# Patient Record
Sex: MALE | Race: WHITE | NOT HISPANIC OR LATINO
[De-identification: names, ages, dates, MRNs, and addresses within clinical notes are randomized per-mention and may not be internally consistent; named-entity substitution may affect disease eponyms.]

---

## 2022-03-07 ENCOUNTER — APPOINTMENT (OUTPATIENT)
Dept: UROLOGY | Facility: CLINIC | Age: 52
End: 2022-03-07
Payer: MEDICAID

## 2022-03-07 VITALS
BODY MASS INDEX: 22.76 KG/M2 | DIASTOLIC BLOOD PRESSURE: 87 MMHG | SYSTOLIC BLOOD PRESSURE: 132 MMHG | HEART RATE: 81 BPM | HEIGHT: 70 IN | WEIGHT: 159 LBS

## 2022-03-07 PROBLEM — Z00.00 ENCOUNTER FOR PREVENTIVE HEALTH EXAMINATION: Status: ACTIVE | Noted: 2022-03-07

## 2022-03-07 PROCEDURE — 99204 OFFICE O/P NEW MOD 45 MIN: CPT

## 2022-03-07 NOTE — ASSESSMENT
[FreeTextEntry1] : \par =======================================================================================\par ASSESSMENT and PLAN\par \par The patient is a 51 year male with a history of the following:\par \par 1. I do not feel a plaque and so I can not tell if he truly has Peyronie's. It bothers him a bit at this time.\par I offered him a consultation with one of our ED partners who specializes in this or he can wait and see how it develops. He could also bring a picture back in as well.\par \par We had a long conversation about this. He knows it is not dangerous and has opted to just observe this for now.\par \par 2. Very slight testicular pain: He had a repeat sono with Dr. Loco that he states was normal. I reassured him.\par \par 3. Diet: We discussed dietary prevention of gout and we talked about fish consumption, safe amounts given contamination and cooking techniques. He understood\par \par -----------------------------------------------------------------------------------------------------\par LABS/TESTS Ordered:\par Meds Ordered:\par Follow up: PRN\par -----------------------------------------------------------------------------------------------------\par \par Greater than 50% of 60 minute visit was spent counseling the patient and coordinating care.\par \par Thank you for allowing me to assist in the care of your patient. Should you have any questions please do not hesitate to reach out to me.\par \par \par Ashley Sparrow MD\par Associate \par Department of Urology\par [facility]\par Phone: [phone]\par Fax: [phone]\par \par [address]\[facility] [address]\[facility]

## 2022-03-07 NOTE — HISTORY OF PRESENT ILLNESS
[FreeTextEntry1] : Language: English\par Date of First visit: 3/7/2022\par Accompanied by: Self\par Contact info: [phone]\par Referring Provider/PCP: Claudy\par \par \par \par CC/ Problem List:\par \par ===============================================================================\par FIRST VISIT:\par The patient is a 51 year male who first presents 03/07/2022 for Peyronie's disease.\par \par He stated he first noticed it around Nov 2021. He thinks it getting worse. He can not recall trauma. He had some swelling when he would masturbate when he was a child. He had some pain in the tip of his penis around 1787-6729. In Jan 2020 he was having pain in his right testicle. He was told he had epididymitis. It is 99.9% gone. He does have an umbilical hernia. \par \par He does not recall any sexual trauma or masturbatory trauma. He thinks the curvature is on the bottom right and it curves to the right. He states his penis kind of "cork screws" right at the base. He is not sexually active so he can not tell if it prevents him from having intercourse. He has no problems urinating.\par \par \par -------------------------------------------------------------------------------------------\par INTERVAL VISITS:\par \par \par ===============================================================================\par \par PMH: Denies\par PSH: Inguinal hernia \par POBH: (if applicable)\par FH: Grandmother had breast cancer\par Father and brother have gout issues\par \par ALL: NKDA\par MEDS: Flonase, Vit D, Fishoil\par SOC: Social wine, No tobacco\par \par \par ROS: Review of Systems is as per HPI unless otherwise denoted below\par \par \par ===============================================================================\par DATA: \par \par LABS:-------------------------------------------------------------------------------------------------------------------\par \par \par \par RADS:-------------------------------------------------------------------------------------------------------------------\par \par \par \par PATHOLOGY/CYTOLOGY:-------------------------------------------------------------------------------------------\par \par \par \par VOIDING STUDIES: ----------------------------------------------------------------------------------------------------\par \par \par \par STONE STUDIES: (Analysis/LLSA)----------------------------------------------------------------------------------\par \par \par \par PROCEDURES: -----------------------------------------------------------------------------------------------\par \par \par \par \par ===============================================================================\par \par PHYSICAL EXAM:\par \par GEN: AAOx3, NAD; Habitus: normal\par \par BARRIERS to CARE: none\par \par PSYCH: Appropriate Behavior, Affect Congruent\par \par HEENT: AT/NC Trachea midline. EOMI.\par \par Lungs: No labored breathing.\par \par NEURO: + Movement, all 4 extremities grossly intact without deficits. No tremors.\par \par SKIN: Warm dry. No visible rashes or ulcers\par \par GAIT: Gait normal, Stability good\par \par \par  FOCUSED: -------------------------------------------------------------------------------------------------\par \par Penis: Normal circ phallus. No lesions, tenderness. There is no curvature in the flaccid state and I do not feel a plaque (he states it is at the right base)\par \par Meatus: No Stenosis, Orthotopic.\par \par =======================================================================================\par \par

## 2023-05-04 ENCOUNTER — APPOINTMENT (OUTPATIENT)
Dept: UROLOGY | Facility: CLINIC | Age: 53
End: 2023-05-04
Payer: MEDICAID

## 2023-05-04 VITALS
TEMPERATURE: 98.2 F | SYSTOLIC BLOOD PRESSURE: 118 MMHG | WEIGHT: 164.99 LBS | HEIGHT: 70 IN | HEART RATE: 95 BPM | BODY MASS INDEX: 23.62 KG/M2 | DIASTOLIC BLOOD PRESSURE: 76 MMHG | OXYGEN SATURATION: 97 %

## 2023-05-04 PROCEDURE — 99213 OFFICE O/P EST LOW 20 MIN: CPT

## 2023-05-07 NOTE — HISTORY OF PRESENT ILLNESS
[FreeTextEntry1] : Language: English\par Date of First visit: 3/7/2022\par Accompanied by: Self\par Contact info: [phone]\par Referring Provider/PCP: Claudy\par \par \par \par CC/ Problem List:\par \par ===============================================================================\par FIRST VISIT:\par The patient is a 51 year male who first presents 03/07/2022 for Peyronie's disease.\par \par He stated he first noticed it around Nov 2021. He thinks it getting worse. He can not recall trauma. He had some swelling when he would masturbate when he was a child. He had some pain in the tip of his penis around 2404-1274. In Jan 2020 he was having pain in his right testicle. He was told he had epididymitis. It is 99.9% gone. He does have an umbilical hernia. \par \par He does not recall any sexual trauma or masturbatory trauma. He thinks the curvature is on the bottom right and it curves to the right. He states his penis kind of "cork screws" right at the base. He is not sexually active so he can not tell if it prevents him from having intercourse. He has no problems urinating.\par -------------------------------------------------------------------------------------------\par Interval History 05/04/2023 (initial visit with Jared):\nicki Presents today for f/u.  Last seen in clinic 03/07/2022.\par \par At his last visit, he elected not to undergo any additional evaluation or mgmt of the peyronie's. Today, he states that he is extremely nervous about fracture while sex.\par \par States he has a good understanding of physics/spatial awareness, and he is worried that any weight on a curved penis will cause fracture.\par \par Currently not in a relationship/sexually active, but he is hesitant to pursue women because of his curve. \par \par Thinks it curves about 40 degrees to the right. Denies pain with erections.\par ===============================================================================\par \par PMH: Denies\par PSH: Inguinal hernia \par POBH: (if applicable)\par FH: Grandmother had breast cancer\par Father and brother have gout issues\par \par ALL: NKDA\par MEDS: Flonase, Vit D, Fishoil\par SOC: Social wine, No tobacco\par \par \par ROS: Review of Systems is as per HPI unless otherwise denoted below\par \par ===============================================================================\par \par PHYSICAL EXAM:\par \par GEN: AAOx3, NAD; Habitus: normal\par \par BARRIERS to CARE: none\par \par PSYCH: Appropriate Behavior, Affect Congruent\par \par HEENT: AT/NC Trachea midline. EOMI.\par \par Lungs: No labored breathing.\par \par NEURO: + Movement, all 4 extremities grossly intact without deficits. No tremors.\par \par SKIN: Warm dry. No visible rashes or ulcers\par \par GAIT: Gait normal, Stability good\par \par \par  FOCUSED: -------------------------------------------------------------------------------------------------\par \par Penis: Normal circ phallus. No lesions, tenderness. There is no curvature in the flaccid state and I do not feel a plaque (he states it is at the right base)\par \par Meatus: No Stenosis, Orthotopic.\par \par ANNA Coleman (5/4/23): normal appearing circ phallus, no appreciable plaque, could not appreciate the plaque the patient states is there at the right base\par \par =======================================================================================\par \par A/P: 52M with abnormal, curbed erections, likely 2/2 ?peyronie's.\par \par We had a long discussion today about Mr. Santiago's condition.  I explained to him that his concerns about fracture are not illegitimate, although a fracture is unlikely to happen simply because of the curve.  I explained the mechanics of sexual activity, and his concerns about a curved penis fracturing are more likely to happens if he hits his erection against a fixed object, which can happen to straight penises as well.  Nonetheless, prior to any decision on next steps, we would need to perform a test erection in the office to determine severity of the curve and his overall anatomy.  Following test erection, we will discuss management options in more detail.

## 2023-05-18 ENCOUNTER — APPOINTMENT (OUTPATIENT)
Dept: UROLOGY | Facility: CLINIC | Age: 53
End: 2023-05-18
Payer: MEDICAID

## 2023-05-18 VITALS
HEART RATE: 87 BPM | HEIGHT: 70 IN | BODY MASS INDEX: 23.62 KG/M2 | DIASTOLIC BLOOD PRESSURE: 83 MMHG | OXYGEN SATURATION: 98 % | WEIGHT: 164.99 LBS | SYSTOLIC BLOOD PRESSURE: 122 MMHG

## 2023-05-18 DIAGNOSIS — N52.9 MALE ERECTILE DYSFUNCTION, UNSPECIFIED: ICD-10-CM

## 2023-05-18 PROCEDURE — 99214 OFFICE O/P EST MOD 30 MIN: CPT | Mod: 25

## 2023-05-18 PROCEDURE — 54235 NJX CORPORA CAVERNOSA RX AGT: CPT

## 2023-05-18 RX ORDER — TADALAFIL 5 MG/1
5 TABLET ORAL
Qty: 30 | Refills: 11 | Status: ACTIVE | COMMUNITY
Start: 2023-05-18 | End: 1900-01-01

## 2023-06-18 NOTE — HISTORY OF PRESENT ILLNESS
[FreeTextEntry1] : Language: English\par Date of First visit: 3/7/2022\par Accompanied by: Self\par Contact info: [phone]\par Referring Provider/PCP: Claudy\par \par CC/ Problem List:\par \par ===============================================================================\par FIRST VISIT:\par The patient is a 51 year male who first presents 03/07/2022 for Peyronie's disease.\par \par He stated he first noticed it around Nov 2021. He thinks it getting worse. He can not recall trauma. He had some swelling when he would masturbate when he was a child. He had some pain in the tip of his penis around 0589-9399. In Jan 2020 he was having pain in his right testicle. He was told he had epididymitis. It is 99.9% gone. He does have an umbilical hernia. \par \par He does not recall any sexual trauma or masturbatory trauma. He thinks the curvature is on the bottom right and it curves to the right. He states his penis kind of "cork screws" right at the base. He is not sexually active so he can not tell if it prevents him from having intercourse. He has no problems urinating.\par -------------------------------------------------------------------------------------------\par Interval History 05/18/23:\nicki Presents today for f/u.  Last seen in clinic 05/04/2023.\par \par At his last visit he stated that he is extremely nervous about fracture while sex.\par \par States he has a good understanding of physics/spatial awareness, and he is worried that any weight on a curved penis will cause fracture.\par \par Currently not in a relationship/sexually active, but he is hesitant to pursue women because of his curve. \par \par Thinks it curves about 40 degrees to the right. Denies pain with erections.\par \par Test erection performed today with 10U Trimix #8.  Pt achieved 9/10 erection. \par Findings: mild curve noted at the midshaft, approx 15-20 degrees.  \par ===============================================================================\par \par PMH: Denies\par PSH: Inguinal hernia \par POBH: (if applicable)\par FH: Grandmother had breast cancer\par Father and brother have gout issues\par \par ALL: NKDA\par MEDS: Flonase, Vit D, Fishoil\par SOC: Social wine, No tobacco\par \par \par ROS: Review of Systems is as per HPI unless otherwise denoted below\par \par ===============================================================================\par \par PHYSICAL EXAM:\par \par GEN: AAOx3, NAD; Habitus: normal\par \par BARRIERS to CARE: none\par \par PSYCH: Appropriate Behavior, Affect Congruent\par \par HEENT: AT/NC Trachea midline. EOMI.\par \par Lungs: No labored breathing.\par \par NEURO: + Movement, all 4 extremities grossly intact without deficits. No tremors.\par \par SKIN: Warm dry. No visible rashes or ulcers\par \par GAIT: Gait normal, Stability good\par \par \par  FOCUSED: -------------------------------------------------------------------------------------------------\par \par Penis: Normal circ phallus. No lesions, tenderness. There is no curvature in the flaccid state and I do not feel a plaque (he states it is at the right base)\par \par Meatus: No Stenosis, Orthotopic.\par \par  Luis Manuelrdloff (5/4/23): normal appearing circ phallus, no appreciable plaque, could not appreciate the plaque the patient states is there at the right base\par  5/18/23 (see above in HPI for test erection results).\par \par =======================================================================================\par \par A/P: 52M with abnormal, curved erections, likely 2/2 peyronie's, test erection performed today showing mild rightward curvature. \par \par I have discussed and reviewed all of the details of the patient’s history as well as physical examination. The patient understands the risks, benefits, and all alternatives to surgery. He understands that while he has been diagnosed with Peyronie’s disease (or abnormal penile curvature), his operation is not a surgical emergency and that it is a purely elective procedure designed to beneficially impact his quality of life. \par \par I have reviewed with him the chronic nature of Peyronie’s disease as well as its associated conditions. The patient now understands that there is an acute (painful) phase to this disease and surgeons normally undertake operative correction of angulation when the disease is in the non-painful (chronic) state. I have reviewed all alternatives to surgery and the patient understands that excision with plaque grafting is usually undertaken for only the most severe types of penile curvature. Also, plaque excision has a notable risk of causing erectile dysfunction (as high as 25% in some series) which would be highly undesirable in patients who are otherwise potent. For patients with concomitant erectile dysfunction non-amenable to oral medications, penile implant surgery (with or without plication) may be indicated. \par \par I've discussed with the patient that there are generally for treatment options for this condition:\par \par 1. Xiaflex/collagenase injections. I discussed the recent innovations in non-surgical therapy that have also been made for those patients with Peyronie’s disease, including the development of collagenase injections (such as Xiaflex). Although promising in theory, there are some serious logistical issues with medications such as Xiaflex including the need for multiple treatments in the office, the need for manual modeling at the time of injection, pain, and a real risk of corporal rupture (1-2%). I explained that the best data indicate that these injection of treatments can result in improvement of her right and by roughly 35% or roughly 20° absolute. I've discussed that 2 injections of the medication are performed within a one-week time period and then manual stretching exercises are performed for the next 4-6 weeks this constitutes one cycle. I've explained that up to 4 treatment cycles might be indicated. I've also explained that I perform a tight dressing over the penis and I have lastly also explained the risk of corporal rupture being low but real.\par \par 2. Penile plication surgery - Due to risks of causing de-aggie erectile dysfunction, I have explained to the patient my practice tends strongly towards penile plication surgery (even in cases with curvature exceeding 60 degrees). I have discussed with the patient the possibility of perceived penile length loss following plication despite large scale series demonstrating that the majority of patients undergoing correction have no measureable decrease in penile length following surgery. I specifically reviewed the series by Ricardo et al. (J. of Urology, 2013) that demonstrated 84% of patients experienced no measureable length loss, but 78% of patients perceived to have lost some measure of penile length. Despite these observations, I have discussed with the patient that penile plication surgery has a far less chance of causing erectile dysfunction following surgery and that penile sensation loss after plication also is uncommon. I have discussed that at the time of plication, 1-2 alprostadil injections are given to stimulate an erection and that operative correction is usually undertaken with an approximately 2 inch incision along the area opposite the greatest curvature. Degloving incisions are usually not performed for this surgery. In rare instances, the alprostadil injection itself may cause an episode of priapism that will then need to be treated appropriately. In terms of plication correction, non-absorbable sutures are placed in order to correct the penis and fewer than 10% of patients feel the sutures 3 months after surgery. For patients with persistent palpability of the suture in long-term follow-up, a steroid injection of the suture site may be necessary (and can usually be performed in the office). Most importantly with plication surgery, the patient now understands that the actual Peyronie’s plaque that is a manifestation of the fibrosis reaction in the corpora cavernosa will not be removed and the patient may continue to feel their respective plaque (as well as correction sutures) following surgery. The patient understands the goal of plication surgery is to beneficially impact quality of life and improve the sexual encounter so as to not cause pain for either the patient or partner at the time of sexual intercourse. The patient understands that he might perceived there to be penile shortening, but that in the majority of cases objective penile shortening is not demonstrated.\par \par 3. Plaque excision/incision with grafting - I have explained the plaque excision or incision with grafting. The patient understands that many patients in my practice that presents with Peyronie's do not choose this option and are not the best candidates do to either concerns over causing de aggie erectile dysfunction, concerns or sensation loss, or poor baseline erections which occurs in the majority of cases. Nevertheless, I have explained that I picked candidates for this procedure quite carefully and that if a patient is deemed to be a good candidate here, they understand a degloving incision was performed, mobilization of the neurovascular bundle was done, and the plaque is either excised or incised depending on intraoperative decision making. I also explained that at the time of excision or incision of the plaque, a graft-like material needs to be performed at the site of plaque mobilization in order to help alleviate the curvature issues. The standard graft that I used is a Tutoplast graft - but I've also had a very novel experience using a collagen fleece-like material called Tachosil for grafting which has had demonstrable success in [place]. The patient understands that generally following this surgery, patients are noted overnight and tedious wound care will need to be performed for roughly 2 weeks. I also discussed with them the need for penile traction therapy if at all feasible which I institute postoperatively based on intraoperative variables and patient motivation. This is predominantly done in order to help with length preservation. Lastly, the patient understands that should he experience sensitivity changes, these will generally improve in the coming weeks to months following surgery. He also understands that erection problems following surgery could be transient and and can be resolved with oral medications but could also be permanent and might require permanent medications, injection of therapy, or even implant surgery. \par \par 4. Traction therapy -I spent some time discussing penile traction therapy. I explained to him that a lot of historic evidence did exist regarding traction therapy but that much of that data really revolved around the concept of wearing a traction device for upwards of 8 to 12 hours/day for a long period of time that extended into months. I did explain however that there is been a fair amount of contemporary Level 1 based evidence regarding traction therapy that has not necessarily been externally validated yet but is currently being validated numerous other centers including some improvements that we have noted at our referral center. Ultimately I described to him the traction therapy could be done for 30 minutes twice a day on a 3-month protocol with improvement in the level of deformity as well as curvature, and length. I also explained to him however that most traction therapies are not covered by insurance and that we would provide him with paperwork for him to gain insurance coverage if his plan is amenable but he would need to coordinate on his part. Ultimately the device would be paid for with cash and then would need to submit for reimbursement from his insurance. He would be directed to videos on how to use the device.\par \par After our discussion, he stated that he would like to hold off on any treatments of his curvature, especially given the mild nature of his curve. He does also have mild ED, and requested PO medications to help with the ED.  Recommended tadalafil 5mg daily.  He was interested.  AEs discussed.  \par \par Lastly, I explained that his curve doesn't look all that severe, and if he or his partners do not experience pain/bother during his sexual activity, it's not always necessary to treat the curvature. \par \par I have handed him my patient information sheet reviewing the options above as well as the details regarding surgery and postoperative expectations. He understands that he can call me if he has any questions.\par \par Given the complexity of this visit and the decision to proceed with treatment after this extensive discussion, I spent greater than 60 minutes in face to face counseling with this patient.\par \par Plan:\par - start tadalafil 5mg daily\par - pt will likely proceed with traction therapy\par - RTC 4 weeks or sooner PRN\par

## 2023-06-29 ENCOUNTER — APPOINTMENT (OUTPATIENT)
Dept: UROLOGY | Facility: CLINIC | Age: 53
End: 2023-06-29
Payer: MEDICAID

## 2023-06-29 VITALS
DIASTOLIC BLOOD PRESSURE: 75 MMHG | HEIGHT: 70 IN | BODY MASS INDEX: 23.62 KG/M2 | WEIGHT: 165 LBS | SYSTOLIC BLOOD PRESSURE: 120 MMHG

## 2023-06-29 PROCEDURE — 99213 OFFICE O/P EST LOW 20 MIN: CPT

## 2023-06-29 NOTE — HISTORY OF PRESENT ILLNESS
[FreeTextEntry1] : Language: English\par Date of First visit: 3/7/2022\par Accompanied by: Self\par Contact info: [phone]\par Referring Provider/PCP: Claudy\par \par CC/ Problem List:\par \par ===============================================================================\par FIRST VISIT:\par The patient is a 51 year male who first presents 03/07/2022 for Peyronie's disease.\par \par He stated he first noticed it around Nov 2021. He thinks it getting worse. He can not recall trauma. He had some swelling when he would masturbate when he was a child. He had some pain in the tip of his penis around 9710-4290. In Jan 2020 he was having pain in his right testicle. He was told he had epididymitis. It is 99.9% gone. He does have an umbilical hernia. \par \par He does not recall any sexual trauma or masturbatory trauma. He thinks the curvature is on the bottom right and it curves to the right. He states his penis kind of "cork screws" right at the base. He is not sexually active so he can not tell if it prevents him from having intercourse. He has no problems urinating.\par -------------------------------------------------------------------------------------------\par Interval History 06/29/2023:\nicki Presents today for f/u.  Last seen in clinic 05/18/23.\par \par At his last visit he had a test erection performed to assess the curvature. We discussed all of his treatment options including conservative mgmt/observation due to the mild curve.  \par \par Pt would like to try traction, however he does not have the money right now and currently waiting to get a job. \par \par He was also started on daily tadalafil 5mg.  States that initially, he had myalgias all over. Switched to every other day, which significantly improved his AEs and still had satisfactory erections.\par \par Currently not in a relationship, so he stopped taking the tadalafil for the time being.\par \par Thinks it curves about 40 degrees to the right. Denies pain with erections.\par \par Test erection 5/18/23: 10U Trimix #8 -  Pt achieved 9/10 erection. \par Findings: mild curve noted at the midshaft, approx 15-20 degrees.  \par ===============================================================================\par \par PMH: Denies\par PSH: Inguinal hernia \par POBH: (if applicable)\par FH: Grandmother had breast cancer\par Father and brother have gout issues\par \par ALL: NKDA\par MEDS: Flonase, Vit D, Fishoil\par SOC: Social wine, No tobacco\par \par \par ROS: Review of Systems is as per HPI unless otherwise denoted below\par \par ===============================================================================\par \par PHYSICAL EXAM:\par \par GEN: AAOx3, NAD; Habitus: normal\par \par BARRIERS to CARE: none\par \par PSYCH: Appropriate Behavior, Affect Congruent\par \par HEENT: AT/NC Trachea midline. EOMI.\par \par Lungs: No labored breathing.\par \par NEURO: + Movement, all 4 extremities grossly intact without deficits. No tremors.\par \par SKIN: Warm dry. No visible rashes or ulcers\par \par GAIT: Gait normal, Stability good\par \par \par  FOCUSED: -------------------------------------------------------------------------------------------------\par \par Penis: Normal circ phallus. No lesions, tenderness. There is no curvature in the flaccid state and I do not feel a plaque (he states it is at the right base)\par \par Meatus: No Stenosis, Orthotopic.\par \par  Luis Manuelrdloff (5/4/23): normal appearing circ phallus, no appreciable plaque, could not appreciate the plaque the patient states is there at the right base\par  5/18/23 (see above in HPI for test erection results).\par \par =======================================================================================\par \par A/P: 52M with abnormal, curved erections, likely 2/2 peyronie's, test erection performed today showing mild rightward curvature. \par \par 1. Peyronie's\par Pt will purchase traction device when he is able.\par \par 2. ED\par Agree with d/c'ing daily tadalafil and to switch to every other day.  Also agree with stopping it completely if he does not need it, as he is currently not in a relationship.\par \par He will call if he needs any refills in the future.\par \par Plan:\par - Restorex when able\par - cont tadalafil PRN\par - RTC PRN - pt to call and make appt\par